# Patient Record
Sex: MALE | Race: WHITE | NOT HISPANIC OR LATINO | URBAN - METROPOLITAN AREA
[De-identification: names, ages, dates, MRNs, and addresses within clinical notes are randomized per-mention and may not be internally consistent; named-entity substitution may affect disease eponyms.]

---

## 2023-05-24 ENCOUNTER — APPOINTMENT (OUTPATIENT)
Dept: RADIOLOGY | Facility: IMAGING CENTER | Age: 43
End: 2023-05-24
Attending: STUDENT IN AN ORGANIZED HEALTH CARE EDUCATION/TRAINING PROGRAM

## 2023-05-24 ENCOUNTER — OFFICE VISIT (OUTPATIENT)
Dept: URGENT CARE | Facility: PHYSICIAN GROUP | Age: 43
End: 2023-05-24
Payer: OTHER GOVERNMENT

## 2023-05-24 VITALS
HEIGHT: 69 IN | OXYGEN SATURATION: 98 % | HEART RATE: 70 BPM | WEIGHT: 180 LBS | DIASTOLIC BLOOD PRESSURE: 82 MMHG | SYSTOLIC BLOOD PRESSURE: 144 MMHG | BODY MASS INDEX: 26.66 KG/M2 | TEMPERATURE: 97.8 F | RESPIRATION RATE: 14 BRPM

## 2023-05-24 DIAGNOSIS — S16.1XXA STRAIN OF NECK MUSCLE, INITIAL ENCOUNTER: ICD-10-CM

## 2023-05-24 DIAGNOSIS — M51.34 THORACIC DEGENERATIVE DISC DISEASE: ICD-10-CM

## 2023-05-24 DIAGNOSIS — R20.2 PARESTHESIAS: ICD-10-CM
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
## 2023-05-24 DIAGNOSIS — M51.36 LUMBAR DEGENERATIVE DISC DISEASE: ICD-10-CM

## 2023-05-24 DIAGNOSIS — M47.812 CERVICAL SPONDYLOSIS: ICD-10-CM

## 2023-05-24 PROCEDURE — 99204 OFFICE O/P NEW MOD 45 MIN: CPT | Performed by: STUDENT IN AN ORGANIZED HEALTH CARE EDUCATION/TRAINING PROGRAM

## 2023-05-24 PROCEDURE — 72070 X-RAY EXAM THORAC SPINE 2VWS: CPT | Mod: TC | Performed by: RADIOLOGY

## 2023-05-24 PROCEDURE — 3077F SYST BP >= 140 MM HG: CPT | Performed by: STUDENT IN AN ORGANIZED HEALTH CARE EDUCATION/TRAINING PROGRAM

## 2023-05-24 PROCEDURE — 72100 X-RAY EXAM L-S SPINE 2/3 VWS: CPT | Mod: TC | Performed by: RADIOLOGY

## 2023-05-24 PROCEDURE — 3079F DIAST BP 80-89 MM HG: CPT | Performed by: STUDENT IN AN ORGANIZED HEALTH CARE EDUCATION/TRAINING PROGRAM

## 2023-05-24 PROCEDURE — 72040 X-RAY EXAM NECK SPINE 2-3 VW: CPT | Mod: TC | Performed by: RADIOLOGY

## 2023-05-24 NOTE — PROGRESS NOTES
Subjective:   CHIEF COMPLAINT  Chief Complaint   Patient presents with    Neck Pain     April 6, had helmet on looking downward, felt neck pain, body is numb.        HPI  Nestor Lawrence is a 43 y.o. male who presents with a chief complaint of numbness of his entire body.  Patient works in a helicopter crew in [employer] and on 4/6/23 says he was doing hoist work during night wearing a helmet with night goggles and heavy gear.  Says he flexed his neck forward developing neck pain and then developed tingling throughout his body.  Upon further questioning says the tingling is on his bilateral upper extremities and his hand, and extending below the nipple line along anterior/posterior chest distally down to his feet.  Says symptoms are aggravated with flexion of his neck.  No alleviating factors.  No additional reliable modifying factors.  Says is not having neck pain anymore but experiencing pins-and-needles sensation.  Says his inner thighs feel numb but he has not had any spontaneous loss of bowels or bladder.  No involvement of his face and denies experiencing any blurry vision, double vision, ataxia, or vertigo.  Denies any past medical history of IV drug use, STIs including HIV or syphilis, or alcohol abuse.    He established with primary care earlier today at a private practice and was told they could not help with the  paperwork so was instructed to go to [facility] or [facility].    Patient has paperwork provided from the [employer].    REVIEW OF SYSTEMS  General: no fever or chills  GI: no nausea or vomiting  See HPI for further details.    PAST MEDICAL HISTORY       SURGICAL HISTORY  patient denies any surgical history    ALLERGIES  No Known Allergies    CURRENT MEDICATIONS  Home Medications       Reviewed by Bolivar Crowe D.O. (Physician) on 05/25/23 at 0901  Med List Status: <None>     Medication Last Dose Status        Patient Reinier Taking any Medications                           SOCIAL  "HISTORY  Social History     Tobacco Use    Smoking status: Not on file    Smokeless tobacco: Not on file   Substance and Sexual Activity    Alcohol use: Not on file    Drug use: Not on file    Sexual activity: Not on file       FAMILY HISTORY  No family history on file.       Objective:   PHYSICAL EXAM  VITAL SIGNS: BP (!) 144/82 (BP Location: Right arm, Patient Position: Sitting, BP Cuff Size: Adult)   Pulse 70   Temp 36.6 °C (97.8 °F) (Temporal)   Resp 14   Ht 1.753 m (5' 9\")   Wt 81.6 kg (180 lb)   SpO2 98%   BMI 26.58 kg/m²     Gen: no acute distress, normal voice  Skin: dry, intact, moist mucosal membranes  Eyes: No conjunctival injection b/l  Neck: Normal range of motion. No meningeal signs.   Lungs: No increased work of breathing.  CTAB w/ symmetric expansion  CV: RRR w/o murmurs or clicks  MSK: No gross abnormalities of C-spine T-spine and L-spine.  Full range of motion of neck, upper extremities and lower extremities without any discernible discomfort.   Neuro:  Speech: conversant, fluent, no aphasia  Mental status: AAOx3  Gait: normal   CN: 2-12 grossly intact  Sensory exam: globally intact  Motor exam: no global deficits   Reflexes: Brisk and equal bilaterally at the level of C6, C7, L4 and S1  Persistent action tremor bilateral upper extremities  No nystagmus, PERRLA  No finger to nose dysmetria  Normal heel-to-toe      RADIOLOGY RESULTS   DX-LUMBAR SPINE-2 OR 3 VIEWS    Result Date: 5/24/2023 5/24/2023 3:35 PM HISTORY/REASON FOR EXAM:  Atraumatic Pain; entire body feels numbe after flexion of his neck. TECHNIQUE/ EXAM DESCRIPTION AND NUMBER OF VIEWS:  3 views of the lumbar spine. COMPARISON: None. FINDINGS: The bony trabecular pattern is normal.  The lumbar vertebral bodies have a normal height and alignment.  The disc spaces are well maintained and symmetrical.  There is no evidence of spondylolisthesis or osseous lesion.  The posterior elements appear grossly normal on the limited series. "     Normal complete lumbar spine series.    DX-THORACIC SPINE-2 VIEWS    Result Date: 5/24/2023 5/24/2023 3:35 PM HISTORY/REASON FOR EXAM:  Atraumatic Pain; entire body feels numbe after flexion of his neck. TECHNIQUE/EXAM DESCRIPTION AND NUMBER OF VIEWS:  Thoracic spine, 2 views. COMPARISON:  None. FINDINGS: The alignment is normal. There is no evidence of fracture.     Unremarkable thoracic spine.    DX-CERVICAL SPINE-2 OR 3 VIEWS    Result Date: 5/24/2023 5/24/2023 3:35 PM HISTORY/REASON FOR EXAM:  Pain Following Trauma; entire body feels numbe after flexion of his neck TECHNIQUE/EXAM DESCRIPTION AND NUMBER OF VIEWS: Cervical spine series, 4 views. COMPARISON:  None. FINDINGS: The alignment of the cervical spine is normal.  The intervertebral disk spaces and vertebral body heights are well maintained. There is mild anterior osteophytosis of the lower cervical spine. There is no soft tissue of abnormality.     Negative cervical spine series.                 Assessment/Plan:     1. Paresthesias  DX-CERVICAL SPINE-2 OR 3 VIEWS    DX-THORACIC SPINE-2 VIEWS    DX-LUMBAR SPINE-2 OR 3 VIEWS    Referral to Occupational Medicine    Referral to Pain Clinic      2. Strain of neck muscle, initial encounter  DX-CERVICAL SPINE-2 OR 3 VIEWS    Referral to Occupational Medicine    Referral to Pain Clinic      3. Work related injury  Referral to Occupational Medicine    Referral to Pain Clinic      4. Cervical spondylosis        5. Lumbar degenerative disc disease        6. Thoracic degenerative disc disease        ? Etiology.  Neurologic exam was unremarkable without any gross motor or sensory deficits.  Multiple views of his spine were negative for any acute osseous abnormalities however did demonstrate degenerative changes throughout all levels but notably at C4 and C5 w/ anterior spurring, which could possibly be contributing to his symptoms, but believe this to be less likely given the lack of any neurologic deficits on  examination.    There is a broad differential and likely needs several follow-up visits for further evaluation.  When he established care with his primary care doc this morning reports she ordered a full panel of labs for general screening which included vitamin B12 therefore I did not order any additional labs for evaluation    I ordered referrals to follow-up with occupational medicine and [facility].  Additionally I completed his paperwork required by [employer].     Return to urgent care any new/worsening symptoms or further questions or concerns.  Patient understood everything discussed.  All questions were answered.    47 minutes was spent caring for this patient on the day of the encounter which included face-to-face time, discussing the diagnosis, medical management, need for follow-up, completion of paperwork, emergency room precautions and completion of the chart. This does not include time spent on separately billable procedures/tests.      Please note that this dictation was created using voice recognition software. I have made a reasonable attempt to correct obvious errors, but I expect that there are errors of grammar and possibly content that I did not discover before finalizing the note.